# Patient Record
(demographics unavailable — no encounter records)

---

## 2025-01-03 NOTE — HISTORY OF PRESENT ILLNESS
[Other: ____] : [unfilled] [FreeTextEntry1] : CAIN is a 12-year-old M present in the [place] office for ingrown toenails b/l big toenails. Patient father states that patient has infected ingrown toenails. Patient father took his son to [facility] yesterday for a follow up due to being concerned for infection. Patient father states they were not able to do much at the hospital they referred him to our office, he was given antibiotics 4 times a day and pain medication. Patient father states his toenails are red, swollen and painful.

## 2025-01-03 NOTE — PHYSICAL EXAM
[General Appearance - Alert] : alert [General Appearance - In No Acute Distress] : in no acute distress [Delayed in the Right Toes] : capillary refills normal in right toes [Delayed in the Left Toes] : capillary refills normal in the left toes [] : normal strength/tone [FreeTextEntry1] : Right hallux medial lateral nail border incurvation with granuloma noted. Left hallux lateral nail border incurvation with granuloma noted.  Edema and local erythema noted Right Left.   Drainage and focal purulence noted.

## 2025-01-03 NOTE — REASON FOR VISIT
[Initial Visit] : an initial visit for [Ingrown Nail] : ingrown nail [Infection] : infection [Parent] : parent [Other: _____] : [unfilled] [FreeTextEntry2] : right medial/lateral and left lateral

## 2025-01-03 NOTE — ASSESSMENT
[FreeTextEntry1] : Discussed diagnosis and treatment with patient  Discussed etiology of symptoms patient is experiencing  Discussed all risks, complications and benefits for procedure  Consent signed, in chart: Right hallux medial lateral and Left hallux lateral partial nail avulsion with phenol matrixectomy 1/3/25 Skin and nail fold prepped with alcohol and the Right Left 1st digit local anesthesia administered with 1% plain lidocaine 2 cc per border Partial nail avulsion performed with sterile English Anvil and hemostat  Sterile curette used at the base of the nail matrix Nail border was then flushed with normal saline  Granuloma resected with English Anvil and cauterized with silver nitrate Phenol applied to the nail matrix and flushed with alcohol/wound cleanser  Dressed with SSD, DSD, and coban. Discussed daily wound care.  Keep dressing clean, dry and intact for next 48 hours, then apply betadine for 1 week, and dry bandage daily  Continue Keflex Rx by ER Discussed all signs and symptoms of local infection. Patient instructed to return to the office as soon as signs and symptoms arise.  Patient to return to the office in 1 week for cellulitis

## 2025-01-10 NOTE — HISTORY OF PRESENT ILLNESS
[Other: ____] : [unfilled] [FreeTextEntry1] : CAIN is a 12-year-old M present in the [place] office for ingrown toenails b/l big toenails. s/p bilateral PPNA  Patient denies pain. He completed antibiotics. Mother has been applying betadine and band aid. Redness has decreased, denies any drainage

## 2025-01-10 NOTE — ASSESSMENT
[FreeTextEntry1] : Discussed diagnosis and treatment with patient  Discussed etiology of symptoms patient is experiencing  s/p Right hallux medial lateral and Left hallux lateral partial nail avulsion with phenol matrixectomy 1/3/25 Aseptically cleaned the partially avulsed nail borders with sterile curette Nail border was then flushed with normal saline  Applied betadine and bandaids--to be continued 1 week Completed Keflex Rx by ER Discussed all signs and symptoms of local infection. Patient instructed to return to the office as soon as signs and symptoms arise.  Patient to return to the office in 2 weeks

## 2025-01-10 NOTE — PHYSICAL EXAM
[General Appearance - Alert] : alert [General Appearance - In No Acute Distress] : in no acute distress [] : normal strength/tone [Delayed in the Right Toes] : capillary refills normal in right toes [Delayed in the Left Toes] : capillary refills normal in the left toes [FreeTextEntry1] : Right hallux medial lateral nail border partially avulsed with granuloma resected. Left hallux lateral nail border partially avulsed with granuloma resected.  Improving edema and local erythema noted Right Left.   No purulence noted.

## 2025-01-10 NOTE — REASON FOR VISIT
[Initial Visit] : an initial visit for [Ingrown Nail] : ingrown nail [Parent] : parent [Other: _____] : [unfilled] [FreeTextEntry2] : s/p right medial/lateral and left lateral PPNA

## 2025-01-24 NOTE — ASSESSMENT
[FreeTextEntry1] : Discussed diagnosis and treatment with patient  Discussed etiology of symptoms patient is experiencing  s/p Right hallux medial lateral and Left hallux lateral partial nail avulsion with phenol matrixectomy 1/3/25 May discontinue betadine and bandaids as all PNA sites appear healed. No signs of infection Completed Keflex Rx by ER Discussed all signs and symptoms of local infection. Patient instructed to return to the office as soon as signs and symptoms arise.  Patient to return to the office PRN

## 2025-01-24 NOTE — REASON FOR VISIT
[Follow-Up Visit] : a follow-up visit for [Ingrown Nail] : ingrown nail [Parent] : parent [Other: _____] : [unfilled] [FreeTextEntry2] : s/p right medial/lateral and left lateral PPNA

## 2025-01-24 NOTE — HISTORY OF PRESENT ILLNESS
[Other: ____] : [unfilled] [FreeTextEntry1] : CAIN is a 12-year-old M present in the [place] office for ingrown toenails b/l big toenails. s/p bilateral PPNA  Patient denies pain. He completed antibiotics. Mother has been applying betadine and band aid. Redness has decreased, denies any drainage   Update 01/24 Patient mother stopped applying betadine and band aid one week ago.  Patient denies pain. Patient mother states no more redness and no more drainage.

## 2025-01-24 NOTE — PHYSICAL EXAM
[General Appearance - Alert] : alert [General Appearance - In No Acute Distress] : in no acute distress [] : normal strength/tone [Delayed in the Right Toes] : capillary refills normal in right toes [Delayed in the Left Toes] : capillary refills normal in the left toes [FreeTextEntry1] : Right hallux medial lateral nail border partially avulsed with granuloma resolved. Left hallux lateral nail border partially avulsed with granuloma resolved.  Resolved edema and local erythema noted Right Left.   No purulence noted.  [No Focal Deficits] : no focal deficits [Mood] : the mood was normal

## 2025-06-30 NOTE — DISCUSSION/SUMMARY
[Normal Growth] : growth [Normal Development] : development  [No Elimination Concerns] : elimination [Continue Regimen] : feeding [No Skin Concerns] : skin [Normal Sleep Pattern] : sleep [None] : no medical problems [Anticipatory Guidance Given] : Anticipatory guidance addressed as per the history of present illness section [Physical Growth and Development] : physical growth and development [Social and Academic Competence] : social and academic competence [Emotional Well-Being] : emotional well-being [Risk Reduction] : risk reduction [Violence and Injury Prevention] : violence and injury prevention [No Vaccines] : no vaccines needed [No Medications] : ~He/She~ is not on any medications [Father] : father [Full Activity without restrictions including Physical Education & Athletics] : Full Activity without restrictions including Physical Education & Athletics [] : The components of the vaccine(s) to be administered today are listed in the plan of care. The disease(s) for which the vaccine(s) are intended to prevent and the risks have been discussed with the caretaker.  The risks are also included in the appropriate vaccination information statements which have been provided to the patient's caregiver.  The caregiver has given consent to vaccinate. [FreeTextEntry1] : Continue balanced diet with all food groups. Brush teeth twice a day with toothbrush. Recommend visit to dentist. Maintain consistent daily routines and sleep schedule. Personal hygiene, puberty, and sexual health reviewed. Risky behaviors assessed. School discussed. Limit screen time to no more than 2 hours per day. Encourage physical activity.   HPV given   Coordination of care reviewed   MIREYAT reviewed.   PHQ9 reviewed.   Cardiac reviewed- no increased risk for SCD   5-2-1-0 reviewed- discussed increased BMI, increased weight gain, need to increase physical activity, healthier food choices, portion control, no soda or juice, no fast food  Labs ordered as below  Passed vision and hearing screenings    Return 1 year for routine well child check or sooner if any concerns

## 2025-06-30 NOTE — HISTORY OF PRESENT ILLNESS
[Father] : father [Yes] : Patient goes to dentist yearly [Toothpaste] : Primary Fluoride Source: Toothpaste [Eats meals with family] : eats meals with family [Has family members/adults to turn to for help] : has family members/adults to turn to for help [Is permitted and is able to make independent decisions] : Is permitted and is able to make independent decisions [Grade: ____] : Grade: [unfilled] [Normal Performance] : normal performance [Normal Behavior/Attention] : normal behavior/attention [Normal Homework] : normal homework [Calcium source] : calcium source [Has friends] : has friends [Screen time (except homework) less than 2 hours a day] : screen time (except homework) less than 2 hours a day [No] : No cigarette smoke exposure [Uses safety belts/safety equipment] : uses safety belts/safety equipment  [Has peer relationships free of violence] : has peer relationships free of violence [Has ways to cope with stress] : has ways to cope with stress [Displays self-confidence] : displays self-confidence [NO] : No [Sleep Concerns] : no sleep concerns [Has concerns about body or appearance] : does not have concerns about body or appearance [At least 1 hour of physical activity a day] : does not do at least 1 hour of physical activity a day [Uses electronic nicotine delivery system] : does not use electronic nicotine delivery system [Exposure to electronic nicotine delivery system] : no exposure to electronic nicotine delivery system [Uses tobacco] : does not use tobacco [Exposure to tobacco] : no exposure to tobacco [Uses drugs] : does not use drugs  [Exposure to drugs] : no exposure to drugs [Drinks alcohol] : does not drink alcohol [Exposure to alcohol] : no exposure to alcohol [Has problems with sleep] : does not have problems with sleep [Gets depressed, anxious, or irritable/has mood swings] : does not get depressed, anxious, or irritable/has mood swings [Has thought about hurting self or considered suicide] : has not thought about hurting self or considered suicide [FreeTextEntry7] : 13 yr [facility] -doing well Saw podiatry January 2025 for treatment of bilateral ingrown toenails [de-identified] : none [de-identified] : Needs HPV # 2 [de-identified] : eats fast food, drinks soda and juice, minimal fruits/vegetables

## 2025-06-30 NOTE — PHYSICAL EXAM
[Alert] : alert [No Acute Distress] : no acute distress [Normocephalic] : normocephalic [EOMI Bilateral] : EOMI bilateral [Clear tympanic membranes with bony landmarks and light reflex present bilaterally] : clear tympanic membranes with bony landmarks and light reflex present bilaterally  [Pink Nasal Mucosa] : pink nasal mucosa [Nonerythematous Oropharynx] : nonerythematous oropharynx [Supple, full passive range of motion] : supple, full passive range of motion [No Palpable Masses] : no palpable masses [Clear to Auscultation Bilaterally] : clear to auscultation bilaterally [Regular Rate and Rhythm] : regular rate and rhythm [Normal S1, S2 audible] : normal S1, S2 audible [No Murmurs] : no murmurs [+2 Femoral Pulses] : +2 femoral pulses [Soft] : soft [NonTender] : non tender [Non Distended] : non distended [Normoactive Bowel Sounds] : normoactive bowel sounds [No Hepatomegaly] : no hepatomegaly [No Splenomegaly] : no splenomegaly [[place]: _____] : Mario [unfilled] [Uncircumcised] : uncircumcised [Foreskin easily retractable] : foreskin easily retractable [Bilateral descended testes] : bilateral descended testes [No Testicular Masses] : no testicular masses [No Abnormal Lymph Nodes Palpated] : no abnormal lymph nodes palpated [Normal Muscle Tone] : normal muscle tone [No Gait Asymmetry] : no gait asymmetry [No pain or deformities with palpation of bone, muscles, joints] : no pain or deformities with palpation of bone, muscles, joints [Straight] : straight [No Scoliosis] : no scoliosis [+2 Patella DTR] : +2 patella DTR [Cranial Nerves Grossly Intact] : cranial nerves grossly intact [No Rash or Lesions] : no rash or lesions